# Patient Record
Sex: MALE | Race: WHITE | NOT HISPANIC OR LATINO | URBAN - METROPOLITAN AREA
[De-identification: names, ages, dates, MRNs, and addresses within clinical notes are randomized per-mention and may not be internally consistent; named-entity substitution may affect disease eponyms.]

---

## 2018-09-24 ENCOUNTER — OFFICE VISIT (OUTPATIENT)
Dept: SLEEP MEDICINE | Facility: CLINIC | Age: 43
End: 2018-09-24
Payer: COMMERCIAL

## 2018-09-24 VITALS
DIASTOLIC BLOOD PRESSURE: 98 MMHG | SYSTOLIC BLOOD PRESSURE: 148 MMHG | HEIGHT: 69 IN | BODY MASS INDEX: 46.65 KG/M2 | HEART RATE: 105 BPM | OXYGEN SATURATION: 96 % | WEIGHT: 315 LBS

## 2018-09-24 DIAGNOSIS — G47.30 SLEEP APNEA, UNSPECIFIED TYPE: ICD-10-CM

## 2018-09-24 DIAGNOSIS — R53.81 MALAISE AND FATIGUE: ICD-10-CM

## 2018-09-24 DIAGNOSIS — R06.00 DYSPNEA AND RESPIRATORY ABNORMALITY: Primary | ICD-10-CM

## 2018-09-24 DIAGNOSIS — R03.0 ELEVATED BP WITHOUT DIAGNOSIS OF HYPERTENSION: ICD-10-CM

## 2018-09-24 DIAGNOSIS — R53.83 MALAISE AND FATIGUE: ICD-10-CM

## 2018-09-24 DIAGNOSIS — Z72.820 LACK OF ADEQUATE SLEEP: ICD-10-CM

## 2018-09-24 DIAGNOSIS — R06.89 DYSPNEA AND RESPIRATORY ABNORMALITY: Primary | ICD-10-CM

## 2018-09-24 PROBLEM — K21.9 GASTROESOPHAGEAL REFLUX DISEASE WITHOUT ESOPHAGITIS: Status: ACTIVE | Noted: 2018-09-24

## 2018-09-24 PROCEDURE — 99204 OFFICE O/P NEW MOD 45 MIN: CPT | Performed by: PHYSICIAN ASSISTANT

## 2018-09-24 NOTE — MR AVS SNAPSHOT
After Visit Summary   9/24/2018    Perez Jasso    MRN: [id number]           Patient Information     Date Of Birth          1975        Visit Information        Provider Department      9/24/2018 8:00 AM Larry Gill PA [facility]        Today's Diagnoses     Dyspnea and respiratory abnormality    -  1    Class 3 obesity due to excess calories with body mass index (BMI) of 50.0 to 59.9 in adult, unspecified whether serious comorbidity present (H)        Lack of adequate sleep        Sleep apnea, unspecified type        Malaise and fatigue          Care Instructions      Your BMI is Body mass index is 54.2 kg/(m^2).  Weight management is a personal decision.  If you are interested in exploring weight loss strategies, the following discussion covers the approaches that may be successful. Body mass index (BMI) is one way to tell whether you are at a healthy weight, overweight, or obese. It measures your weight in relation to your height.  A BMI of 18.5 to 24.9 is in the healthy range. A person with a BMI of 25 to 29.9 is considered overweight, and someone with a BMI of 30 or greater is considered obese. More than two-thirds of American adults are considered overweight or obese.  Being overweight or obese increases the risk for further weight gain. Excess weight may lead to heart disease and diabetes.  Creating and following plans for healthy eating and physical activity may help you improve your health.  Weight control is part of healthy lifestyle and includes exercise, emotional health, and healthy eating habits. Careful eating habits lifelong are the mainstay of weight control. Though there are significant health benefits from weight loss, long-term weight loss with diet alone may be very difficult to achieve- studies show long-term success with dietary management in less than 10% of people. Attaining a healthy weight may be especially difficult to achieve in those with severe  obesity. In some cases, medications, devices and surgical management might be considered.  What can you do?  If you are overweight or obese and are interested in methods for weight loss, you should discuss this with your provider.     Consider reducing daily calorie intake by 500 calories.     Keep a food journal.     Avoiding skipping meals, consider cutting portions instead.    Diet combined with exercise helps maintain muscle while optimizing fat loss. Strength training is particularly important for building and maintaining muscle mass. Exercise helps reduce stress, increase energy, and improves fitness. Increasing exercise without diet control, however, may not burn enough calories to loose weight.       Start walking three days a week 10-20 minutes at a time    Work towards walking thirty minutes five days a week     Eventually, increase the speed of your walking for 1-2 minutes at time    In addition, we recommend that you review healthy lifestyles and methods for weight loss available through the National Institutes of Health patient information sites:  http://win.niddk.nih.gov/publications/index.htm    And look into health and wellness programs that may be available through your health insurance provider, employer, local community center, or dianna club.    Weight management plan: Patient was referred to their PCP to discuss a diet and exercise plan.              Follow-ups after your visit        Your next 10 appointments already scheduled     Oct 26, 2018  8:30 AM CDT   LAB with LAB FIRST FLOOR [facility] ([facility])    [id number] [place] [address] [phone]           Please do not eat 10-12 hours before your appointment if you are coming in fasting for labs on lipids, cholesterol, or glucose (sugar). This does not apply to pregnant women. Water, hot tea and black coffee (with nothing added) are okay. Do not drink other fluids, diet soda or  "chew gum.            Oct 28, 2018  8:00 PM CDT   Psg Split W/Tcm with BK BED 2   [facility] ([facility])    [address]  [facility] [id number] [phone]            Nov 12, 2018  2:20 PM CST   Return Sleep Patient with LULY Jasso   [facility] ([facility])    [address]  [facility] [id number] [phone]              Future tests that were ordered for you today     Open Future Orders        Priority Expected Expires Ordered    Blood gas venous Routine  9/24/2019 9/24/2018    Comprehensive Sleep Study Routine  3/23/2019 9/24/2018            Who to contact     If you have questions or need follow up information about today's clinic visit or your schedule please contact [facility] directly at [phone].  Normal or non-critical lab and imaging results will be communicated to you by Conferensumhart, letter or phone within 4 business days after the clinic has received the results. If you do not hear from us within 7 days, please contact the clinic through Kamicatt or phone. If you have a critical or abnormal lab result, we will notify you by phone as soon as possible.  Submit refill requests through AgroSavfe or call your pharmacy and they will forward the refill request to us. Please allow 3 business days for your refill to be completed.          Additional Information About Your Visit        AgroSavfe Information     AgroSavfe lets you send messages to your doctor, view your test results, renew your prescriptions, schedule appointments and more. To sign up, go to [email or web address] . Click on \"Log in\" on the left side of the screen, which will take you to the Welcome page. Then click on \"Sign up Now\" on the right side of the page.     You will be asked to enter the access code listed below, as well as some personal information. Please follow the directions " "to create your username and password.     Your access code is: [email or web address]  Expires: 2018  8:45 AM     Your access code will  in 90 days. If you need help or a new code, please call your [place] clinic or [phone].        Care EveryWhere ID     This is your Care EveryWhere ID. This could be used by other organizations to access your [place] medical records  [id number]        Your Vitals Were     Pulse Height Pulse Oximetry BMI (Body Mass Index)          105 1.753 m (5' 9\") 96% 54.2 kg/m2         Blood Pressure from Last 3 Encounters:   18 (!) 148/98    Weight from Last 3 Encounters:   18 (!) 166.5 kg (367 lb)               Primary Care Provider Fax #    Physician No Ref-Primary [phone]       No address on file        Equal Access to Services     [facility] : Hadii narcisa fostero Sojosh, waaxda luqadaha, qaybta kaalmada adejoannayada, leah haines . So [facility] [phone].    ATENCIÓN: Si habla español, tiene a cisneros disposición servicios gratuitos de asistencia lingüística. Yousif al [phone].    We comply with applicable federal civil rights laws and [place] laws. We do not discriminate on the basis of race, color, national origin, age, disability, sex, sexual orientation, or gender identity.            Thank you!     Thank you for choosing [facility]  for your care. Our goal is always to provide you with excellent care. Hearing back from our patients is one way we can continue to improve our services. Please take a few minutes to complete the written survey that you may receive in the mail after your visit with us. Thank you!             Your Updated Medication List - Protect others around you: Learn how to safely use, store and throw away your medicines at www.disposemymeds.org.          This list is accurate as of 18  8:45 AM.  Always use your most recent med list.                   Brand Name Dispense Instructions for use " Diagnosis    OMEPRAZOLE PO

## 2018-09-24 NOTE — PATIENT INSTRUCTIONS

## 2018-09-24 NOTE — PROGRESS NOTES
Sleep Consultation:    Date on this visit: 9/24/2018    Perez Jasso is sent by No ref. provider found for a sleep consultation    Primary Physician: No Ref-Primary, Physician     Chief Complaint   Patient presents with     Sleep Problem     consult-Chronic fatigue, told I snore too loudly        Perez goes to sleep at 11:00 PM during the week. He wakes up at 7:30 AM with an alarm. He falls asleep in 20 minutes.  Perez denies difficulty falling asleep.  He wakes up 4-6 times a night for 5 minutes before falling back to sleep.  Perez wakes up to gasping and going to the bathroom.  On weekends, Perez goes to sleep at 11:00 PM.  He wakes up at 8:00 AM without an alarm. He falls asleep in 20 minutes.  Patient gets an average of 7 hours of sleep per night. He does not feel refreshed.     Patient does not use electronics in bed, watch TV in bed and read in bed.     Perez does not do shift work.       Perez does snore every night and snoring is very loud. Patient does not have a regular bed partner. There is report of snoring and gasping.  He does have witnessed apneas. Patient sleeps on his back and stomach. He has frequent morning dry mouth and morning headaches(1-2/week), denies no morning confusion and restless legs. Perez denies any bruxism, sleep walking, sleep talking, dream enactment, sleep paralysis, cataplexy and hypnogogic/hypnopompic hallucinations.    Perez has difficulty breathing through his nose, denies claustrophobia and reflux at night.      Perez has gained 40-50 pounds in the last 2 years.  Patient describes themself as a morning person.  He would prefer to go to sleep at 11:00 PM and wake up at 7:00 AM.  Patient's [place] Sleepiness score 19/24 consistent with excessive  daytime sleepiness.      Perez naps 7 times per week for  minutes, feels refreshed after naps. He takes some inadvertant naps.  He denies falling asleep while driving.  Patient was counseled on the importance of driving while  alert, to pull over if drowsy, or nap before getting into the vehicle if sleepy.  He uses 200-300 mg of caffeine in tea and energy drinks. Last caffeine intake is usually before 1 PM.    Allergies:    No Known Allergies    Medications:    Current Outpatient Prescriptions   Medication Sig Dispense Refill     OMEPRAZOLE PO          Problem List:  Patient Active Problem List    Diagnosis Date Noted     Class 3 obesity due to excess calories with body mass index (BMI) of 50.0 to 59.9 in adult, unspecified whether serious comorbidity present (H) 09/24/2018     Priority: Medium     Gastroesophageal reflux disease without esophagitis 09/24/2018     Priority: Medium        Past Medical/Surgical History:  No past medical history on file.  Past Surgical History:   Procedure Laterality Date     TONSILLECTOMY  1985       Social History:  Social History     Social History     Marital status: Single     Spouse name: N/A     Number of children: N/A     Years of education: N/A     Occupational History     Not on file.     Social History Main Topics     Smoking status: Never Smoker     Smokeless tobacco: Never Used     Alcohol use Yes      Comment: socially     Drug use: No     Sexual activity: No     Other Topics Concern     Not on file     Social History Narrative     No narrative on file       Family History:  Family History   Problem Relation Age of Onset     Sleep Apnea Father      Sleep Apnea Brother        Review of Systems:    CONSTITUTIONAL: POSITIVE for weight gain. NEGATIVE for fever, chills, sweats or night sweats, drug allergies.  EYES: NEGATIVE for changes in vision, blind spots, double vision.  ENT: NEGATIVE for ear pain, sore throat, sinus pain, post-nasal drip, runny nose, bloody nose  CARDIAC: NEGATIVE for fast heartbeats or fluttering in chest, chest pain or pressure, breathlessness when lying flat, swollen legs or swollen feet.  NEUROLOGIC: NEGATIVE headaches, weakness or numbness in the arms or  "legs.  DERMATOLOGIC: NEGATIVE for rashes, new moles or change in mole(s)  PULMONARY: NEGATIVE SOB at rest, SOB with activity, dry cough, productive cough, coughing up blood, wheezing or whistling when breathing.    GASTROINTESTINAL: NEGATIVE for nausea or vomitting, loose or watery stools, fat or grease in stools, constipation, abdominal pain, bowel movements black in color or blood noted.  GENITOURINARY: NEGATIVE for pain during urination, blood in urine, urinating more frequently than usual.  MUSCULOSKELETAL: NEGATIVE for muscle pain, bone or joint pain, swollen joints.  ENDOCRINE: NEGATIVE for increased thirst or urination, diabetes.  LYMPHATIC: NEGATIVE for swollen lymph nodes, lumps or bumps in the breasts or nipple discharge.    Physical Examination:  Vitals: BP (!) 148/98  Pulse 105  Ht 1.753 m (5' 9\")  Wt (!) 166.5 kg (367 lb)  SpO2 96%  BMI 54.2 kg/m2  BMI= Body mass index is 54.2 kg/(m^2).    Neck Cir (cm): 48 cm    [place] Total Score 9/24/2018   Total score - [place] 19       LUDIVINA Total Score: 21 (09/24/18 0700)    GENERAL APPEARANCE: alert and no distress  EYES: Eyes grossly normal to inspection, PERRL and conjunctivae and sclerae normal  HENT: ear canals and TM's normal, nose and mouth without ulcers or lesions, oropharynx crowded and tongue base enlarged  NECK: no asymmetry, masses, or scars and thyroid normal to palpation  RESP: lungs clear to auscultation - no rales, rhonchi or wheezes  CV: regular rates and rhythm and normal S1 S2, no S3 or S4  MS: extremities normal- no gross deformities noted  NEURO: Normal strength and tone, mentation intact and speech normal  PSYCH: mentation appears normal and affect normal/bright  Mallampati Class: IV.  Tonsillar Stage: 0  surgically removed.    Last Comprehensive Metabolic Panel:  No results found for: NA, POTASSIUM, CHLORIDE, CO2, ANIONGAP, GLC, BUN, CR, GFRESTIMATED, DONYA    ASSESSMENT:   High probability of obstructive sleep apnea with significant " "possibility of coexisting hypoventilation based on BMI of 54, loud snoring, witnessed apnea, non-refreshing sleep, excessive daytime sleepiness(ESS 19), difficulty maintaining sleep, crowded oropharynx, morning headaches, neck circumference of 18.75\" and strong family history of CATHY. The STOP-BANG score is 6/8.   PLAN:    Recommend Polysomnogram with transcutaneous C02 monitoring and pre-study VBG to evaluate for obstructive sleep apnea, hypoventilation and hypoxemia.  Given the patient's risk of hypoventilation and severity of obesity, laboratory study would be preferable. Patient was counseled in alternative therapies for management of sleep apnea, including weight loss strategies and surgical management.      Literature provided regarding sleep apnea.      He will follow up with me in approximately two weeks after his sleep study has been competed to review the results and discuss plan of care.       Polysomnography reviewed.  Obstructive sleep apnea reviewed.  Complications of untreated sleep apnea were reviewed.    Larry Gill PA-C    CC: No ref. provider found        "

## 2018-09-24 NOTE — NURSING NOTE
"Chief Complaint   Patient presents with     Sleep Problem     consult-Chronic fatigue-issues have esculated with the past year.        Initial BP (!) 148/98  Pulse 105  Ht 1.753 m (5' 9\")  Wt (!) 166.5 kg (367 lb)  SpO2 96%  BMI 54.2 kg/m2 Estimated body mass index is 54.2 kg/(m^2) as calculated from the following:    Height as of this encounter: 1.753 m (5' 9\").    Weight as of this encounter: 166.5 kg (367 lb).    Medication Reconciliation: complete    Neck circumference: 18.75 inches / 48 centimeters.        "

## 2018-10-26 DIAGNOSIS — R06.00 DYSPNEA AND RESPIRATORY ABNORMALITY: ICD-10-CM

## 2018-10-26 DIAGNOSIS — R06.89 DYSPNEA AND RESPIRATORY ABNORMALITY: ICD-10-CM

## 2018-10-26 LAB
CO2 BLDCOV-SCNC: 28 MMOL/L (ref 21–28)
PCO2 BLDV: 42 MM HG (ref 40–50)
PH BLDV: 7.43 PH (ref 7.32–7.43)
PO2 BLDV: 68 MM HG (ref 25–47)
SAO2 % BLDV FROM PO2: 94 %

## 2018-10-28 ENCOUNTER — THERAPY VISIT (OUTPATIENT)
Dept: SLEEP MEDICINE | Facility: CLINIC | Age: 43
End: 2018-10-28
Payer: COMMERCIAL

## 2018-10-28 DIAGNOSIS — G47.30 SLEEP APNEA, UNSPECIFIED TYPE: ICD-10-CM

## 2018-10-28 DIAGNOSIS — R53.83 MALAISE AND FATIGUE: ICD-10-CM

## 2018-10-28 DIAGNOSIS — R06.89 DYSPNEA AND RESPIRATORY ABNORMALITY: ICD-10-CM

## 2018-10-28 DIAGNOSIS — Z72.820 LACK OF ADEQUATE SLEEP: ICD-10-CM

## 2018-10-28 DIAGNOSIS — E66.813 CLASS 3 SEVERE OBESITY DUE TO EXCESS CALORIES WITH SERIOUS COMORBIDITY AND BODY MASS INDEX (BMI) OF 50.0 TO 59.9 IN ADULT (H): ICD-10-CM

## 2018-10-28 DIAGNOSIS — G47.33 OSA (OBSTRUCTIVE SLEEP APNEA): ICD-10-CM

## 2018-10-28 DIAGNOSIS — R06.00 DYSPNEA AND RESPIRATORY ABNORMALITY: ICD-10-CM

## 2018-10-28 DIAGNOSIS — E66.01 CLASS 3 SEVERE OBESITY DUE TO EXCESS CALORIES WITH SERIOUS COMORBIDITY AND BODY MASS INDEX (BMI) OF 50.0 TO 59.9 IN ADULT (H): ICD-10-CM

## 2018-10-28 DIAGNOSIS — R53.81 MALAISE AND FATIGUE: ICD-10-CM

## 2018-10-28 PROCEDURE — 95811 POLYSOM 6/>YRS CPAP 4/> PARM: CPT | Performed by: INTERNAL MEDICINE

## 2018-10-28 NOTE — MR AVS SNAPSHOT
"              After Visit Summary   10/28/2018    Perez Jasso    MRN: [id number]           Patient Information     Date Of Birth          1975        Visit Information        Provider Department      10/28/2018 8:00 PM BK BED 2 [facility]        Today's Diagnoses     Class 3 severe obesity due to excess calories with serious comorbidity and body mass index (BMI) of 50.0 to 59.9 in adult (H)        Lack of adequate sleep        Sleep apnea, unspecified type        Dyspnea and respiratory abnormality        Malaise and fatigue           Follow-ups after your visit        Your next 10 appointments already scheduled     Nov 12, 2018  2:20 PM CST   Return Sleep Patient with LULY Jasso   [facility] ([facility])    [address] [address] [phone]              Who to contact     If you have questions or need follow up information about today's clinic visit or your schedule please contact [facility] directly at [phone].  Normal or non-critical lab and imaging results will be communicated to you by Atomic Mogulshart, letter or phone within 4 business days after the clinic has received the results. If you do not hear from us within 7 days, please contact the clinic through Atomic Mogulshart or phone. If you have a critical or abnormal lab result, we will notify you by phone as soon as possible.  Submit refill requests through Arkmicro or call your pharmacy and they will forward the refill request to us. Please allow 3 business days for your refill to be completed.          Additional Information About Your Visit        Atomic Mogulshart Information     Arkmicro lets you send messages to your doctor, view your test results, renew your prescriptions, schedule appointments and more. To sign up, go to [email or web address] . Click on \"Log in\" on the left side of the screen, which will take you to the Welcome page. Then " "click on \"Sign up Now\" on the right side of the page.     You will be asked to enter the access code listed below, as well as some personal information. Please follow the directions to create your username and password.     Your access code is: [email or web address]  Expires: 2018  8:45 AM     Your access code will  in 90 days. If you need help or a new code, please call your [place] clinic or [phone].        Care EveryWhere ID     This is your Care EveryWhere ID. This could be used by other organizations to access your [place] medical records  [id number]         Blood Pressure from Last 3 Encounters:   18 (!) 148/98    Weight from Last 3 Encounters:   18 (!) 166.5 kg (367 lb)              We Performed the Following     Comprehensive Sleep Study        Primary Care Provider Fax #    Physician No Ref-Primary [phone]       No address on file        Equal Access to Services     GEOVANY OCASIO : Hadii narcisa Abarca, waaxda lubenjamin, qaybta kaalmada adebhanu, leah haines . So [facility] [phone].    ATENCIÓN: Si habla español, tiene a cisneros disposición servicios gratuitos de asistencia lingüística. Llame al [phone].    We comply with applicable federal civil rights laws and [place] laws. We do not discriminate on the basis of race, color, national origin, age, disability, sex, sexual orientation, or gender identity.            Thank you!     Thank you for choosing [facility]  for your care. Our goal is always to provide you with excellent care. Hearing back from our patients is one way we can continue to improve our services. Please take a few minutes to complete the written survey that you may receive in the mail after your visit with us. Thank you!             Your Updated Medication List - Protect others around you: Learn how to safely use, store and throw away your medicines at www.disposemymeds.org.          This list is accurate as " of 10/28/18 11:59 PM.  Always use your most recent med list.                   Brand Name Dispense Instructions for use Diagnosis    OMEPRAZOLE PO

## 2018-10-29 PROBLEM — K21.9 GASTROESOPHAGEAL REFLUX DISEASE WITHOUT ESOPHAGITIS: Chronic | Status: ACTIVE | Noted: 2018-09-24

## 2018-10-29 PROBLEM — G47.33 OSA (OBSTRUCTIVE SLEEP APNEA): Status: ACTIVE | Noted: 2018-10-29

## 2018-10-29 PROBLEM — G47.33 OSA (OBSTRUCTIVE SLEEP APNEA): Chronic | Status: ACTIVE | Noted: 2018-10-29

## 2018-10-29 NOTE — PROCEDURES
" SLEEP STUDY INTERPRETATION  SPLIT NIGHT STUDY      Patient: THIERNO MEIER  YOB: 1975  Study Date: 10/28/2018  MRN: [id number]  Ordering Provider: LULY Geller    Indications for Polysomnography: The patient is a 42 y old Male who is 5' 9\" and weighs 367.0 lbs. His BMI is 54.4, Charlotte sleepiness scale 19.0 and neck circumference is 48.0 cm. A diagnostic polysomnogram was performed to evaluate forHigh probability of obstructive sleep apnea with significant possibility of coexisting hypoventilation based on BMI of 54, loud snoring, witnessed apnea, non-refreshing sleep, excessive daytime sleepiness (ESS 19), difficulty maintaining sleep, crowded oropharynx, morning headaches, neck circumference of 18.75\" and strong family history of CATHY. After 133.0 minutes of sleep time the patient exhibited sufficient respiratory events qualifying him for a CPAP trial which was then initiated.    Polysomnogram Data: A full night polysomnogram recorded the standard physiologic parameters including EEG, EOG, EMG, ECG, nasal and oral airflow. Respiratory parameters of chest and abdominal movements were recorded with respiratory inductance plethysmography. Oxygen saturation was recorded by pulse oximetry.  Hypopnea scoring rule used: 1B 4%    Diagnostic PSG  Sleep Architecture:   The total recording time of the polysomnogram was 238.7 minutes. The total sleep time was 133.0 minutes. Sleep latency was increased at 47.2 minutes without the use of a sleep aid. REM latency was n/a. Arousal index was increased at 123.6 arousals per hour. Sleep efficiency was decreased at 55.7%. Wake after sleep onset was 45.0 minutes. The patient spent 53.0% of total sleep time in Stage N1, 47.0% in Stage N2, 0.0% in Stage N3, and 0.0% in REM.     Respiration:     Events ? The polysomnogram revealed a presence of 250 obstructive, 5 central, and 9 mixed apneas resulting in an apnea index of 119.1 events per hour. There were 43 " obstructive hypopneas and 0 central hypopneas resulting in an obstructive hypopnea index of 19.4 and central hypopnea index of 0 events per hour. The combined apnea/hypopnea index was 138.5 events per hour (central apnea/hypopnea index was 2.3 events per hour).  The REM AHI was n/a. The supine AHI was n/a. The RERA index was 0.5 events per hour. The RDI was 138.9 events per hour.    Snoring - was reported as loud.    Respiratory rate and pattern - was notable for normal respiratory rate and pattern.    Sustained Sleep Associated Hypoventilation - Transcutaneous carbon dioxide monitoring was used, however significant hypoventilation was not suggested with a maximum change from 38 to 45 mmHg.    Sleep Associated Hypoxemia - (Greater than 5 minutes O2 sat at or below 88%) was present. Baseline oxygen saturation was 91.1%. Lowest oxygen saturation was 72.9%. Time spent less than or equal to 88% was 61.2 minutes. Time spent less than or equal to 89% was 68.0 minutes.     Treatment PSG  Sleep Architecture:   At 02:58:03 AM the patient was placed on PAP treatment and was titrated at pressures ranging from CPAP 6 cmH2O up to CPAP 12 cmH2O. The total recording time of the treatment portion of the study was 237.7 minutes. The total sleep time was 178.0 minutes. During the treatment portion of the study the sleep latency was 20.0 minutes. REM latency was 41.0 minutes. Arousal index was normal at 12.1 arousals per hour. Sleep efficiency was/increased at 74.9%. Wake after sleep onset was 39.0 minutes. The patient spent 11.0% of total sleep time in Stage N1, 26.1% in Stage N2, 23.3% in Stage N3, and 39.6% in REM. Time in REM supine was 29.5 minutes.     Respiration:     The final pressure was CPAP 12 cmH2O with an AHI of 12.4 events per hour. Time in REM supine on final pressure was 26.5 minutes.     This titration was considered good (residual AHI < 10 events per hour or 50% decrease if baseline AHI > 15 events per hour, and  including REM?supine sleep at final pressure).    Movement Activity:     Periodic Limb Movements  o During the diagnostic portion of the study, there were 0 PLMs recorded.   o During the treatment portion of the study, there were 34 PLMs recorded. The PLM index was 11.5 movements per hour. The PLM Arousal Index was 0 per hour.    REM EMG Activity - Excessive transient/sustained muscle activity was not present.    Nocturnal Behavior - Abnormal sleep related behaviors were not noted     Bruxism - None apparent.    Cardiac Summary: Arrhythmias were not noted.      Assessment:     Very severe obstructive sleep apnea with severe sleep-related hypoxemia    Good PAP titration    Recommendations:    Treatment of CATHY with CPAP at 13 cmH2O or with Auto?titrating PAP therapy with a range of 10  cmH2O to 14 cmH2O. Recommend clinical follow up with sleep management team, including review of compliance measures.    Patient may be a candidate for dental appliance through referral to Sleep Dentistry for the treatment of obstructive sleep apnea and/or socially disruptive snoring.    If devices are not acceptable or effective, patient may benefit from evaluation of possible surgical options for the treatment of obstructive sleep apnea and/or socially disruptive snoring. If he is interested, would recommend referral to specialized ENT?Sleep provider.    Advice regarding the risks of drowsy driving.    Suggest optimizing sleep schedule and avoiding sleep deprivation.    Weight management (if BMI > 30).    Pharmacologic therapy should be used for management of restless legs syndrome only if present and clinically indicated and not based on the presence of periodic limb movements alone.    Diagnostic Codes:   Obstructive Sleep Apnea G47.33  Sleep Hypoxemia/Hypoventilation G47.36          _____________________________________   Electronically Signed By: Ha Bloom MD 10/29/18         Range(%) Time in range (min)   0.0 - 88.0 61.2   0.0 -  89.0 68.0       Stage Min(mm Hg) Max(mm Hg)   Wake 36.8 55.8   NREM(1+2+3) 39.7 46.0   REM - -         Range(mmHg) Time in range (min)   55.0 - 100.0 0.2   Excluded data <20.0 & >90.0 12.6

## 2018-10-30 ENCOUNTER — TELEPHONE (OUTPATIENT)
Dept: SLEEP MEDICINE | Facility: CLINIC | Age: 43
End: 2018-10-30

## 2018-10-30 DIAGNOSIS — G47.33 OSA (OBSTRUCTIVE SLEEP APNEA): ICD-10-CM

## 2018-10-30 DIAGNOSIS — G47.33 OBSTRUCTIVE SLEEP APNEA: Primary | ICD-10-CM

## 2018-10-30 LAB — SLPCOMP: NORMAL

## 2018-10-30 NOTE — PROGRESS NOTES
I spoke with the patient and discussed his sleep study results. He has very severe obstructive sleep apnea with severe sleep-related hypoxemia.    10/28/2018 [facility] Sleep Study (367.0 lbs) - .5, .9, Supine AHI -, REM AHI -, Low O2% 72.9%, Time Spent ?88% 61.2, Time Spent ?89% 68.0. Treatment was titrated to a pressure of CPAP 12 with an AHI 12.4. Time spent in REM supine at this pressure was 26.5 minutes.    Comprehensive DME. He will call [facility] for a CPAP set up.10

## 2018-10-30 NOTE — TELEPHONE ENCOUNTER
Called and spoke with patient. Scheduled appointment for URGENT PAP setup at [facility] on 10/31/18 at 9:30am. Gave address and location information to patient ([address]).

## 2018-10-31 ENCOUNTER — DOCUMENTATION ONLY (OUTPATIENT)
Dept: SLEEP MEDICINE | Facility: CLINIC | Age: 43
End: 2018-10-31

## 2018-10-31 DIAGNOSIS — G47.33 OSA (OBSTRUCTIVE SLEEP APNEA): Primary | Chronic | ICD-10-CM

## 2018-10-31 NOTE — PROGRESS NOTES
Patient was offered choice of vendor and chose [facility].  Patient Perez Jasso was set up at [employer] on October 31, 2018. Patient received a Resmed AirSense 10 Auto. Pressures were set at 10-16 cm H2O.   Patient s ramp is 6 cm H2O for Auto and FLEX/EPR is EPR, 2.  Patient received a [employer] Mask name: [id number]  Nasal mask Size Medium, heated tubing and heated humidifier.  Patient is enrolled in the STM Program and does not need to meet compliance. Patient has a follow up on 11/12/18 with LULY Adame.    Nuzhat Porter

## 2018-11-03 ENCOUNTER — DOCUMENTATION ONLY (OUTPATIENT)
Dept: SLEEP MEDICINE | Facility: CLINIC | Age: 43
End: 2018-11-03

## 2018-11-03 DIAGNOSIS — G47.33 OSA (OBSTRUCTIVE SLEEP APNEA): ICD-10-CM

## 2018-11-03 NOTE — PROGRESS NOTES
3 DAY STM VISIT    Diagnostic AHI: 138.9 PSG    Patient contacted for 3 day STM visit  Subjective measures:  Pt states that he's still getting used to it.  He feels like when he gets up to go to the bathroom that he has a hard time falling back asleep with it because he isn't getting enough air.  He doesn't want to change his ramp right now but he does know how to do it and will consider doing that to see if it helps get him to a higher pressure quicker so he can stay asleep.     Device type: Auto-CPAP  PAP settings from order::  CPAP min 10 cm  H20       CPAP max 16 cm  H20  Mask type:    Nasal Mask     Device settings from machine      Min CPAP 10            Max CPAP 16     Assessment: Nightly usage, most nights under four hours.  Action plan: Pt to have f/u 14 day [facility] visit.  Patient has a follow up visit scheduled:   yes within 31-90 days of set up.

## 2018-11-15 ENCOUNTER — DOCUMENTATION ONLY (OUTPATIENT)
Dept: SLEEP MEDICINE | Facility: CLINIC | Age: 43
End: 2018-11-15

## 2018-11-15 DIAGNOSIS — G47.33 OSA (OBSTRUCTIVE SLEEP APNEA): ICD-10-CM

## 2018-11-15 NOTE — PROGRESS NOTES
14 DAY STM VISIT    Diagnostic AHI: 138.9 PSG    Subjective measures:   Pt states that he's getting used to it.  He's taking it off in the middle of the night without realizing it.  He's not sure why he's taking it off.  He thinks it might be the pressure waking him up because it's going up to 16cm H2O and he feels like that's too high. His 95% pressure is 13cm H2O so will send order to provider to cap pressure at 14cm H2O to see if that helps keep him from taking off the mask in the middle of the night    Assessment: Pt not meeting objective benchmarks for compliance  Patient failing following subjective benchmarks: pressure issues and dryness  Action plan: order placed to provider for pressure change and pt to have 30 day STM visit.    Device type: Auto-CPAP  PAP settings: CPAP min 10.0 cm  H20     CPAP max 16.0 cm  H20    95th% pressure 12.8 cm   Mask type:  Nasal Mask  Objective measures: 14 day rolling measures      Compliance  0 %      Leak  15 lpm  last  upload      AHI 0.87   last  upload      Average number of minutes 155     Average hours of usage 2.6          Objective measure goal  Compliance   Goal >70%  Leak   Goal < 24 lpm  AHI  Goal < 5  Usage  Goal >240

## 2018-12-03 ENCOUNTER — DOCUMENTATION ONLY (OUTPATIENT)
Dept: SLEEP MEDICINE | Facility: CLINIC | Age: 43
End: 2018-12-03

## 2018-12-03 DIAGNOSIS — G47.33 OSA (OBSTRUCTIVE SLEEP APNEA): ICD-10-CM

## 2018-12-03 NOTE — PROGRESS NOTES
30 DAY STM VISIT    Diagnostic AHI: 138.9 PSG    Message left for patient to return call     Assessment: Pt not meeting objective benchmarks for compliance     Action plan: waiting for patient to return call.  and 2 week STM recheck appt scheduled  Patient has scheduled a follow up visit with LULY Adame on 12/13/18.   Device type: Auto-CPAP  PAP settings: CPAP min 10.0 cm  H20     CPAP max 14.0 cm  H20    CPAP fixed 0 cm  H20    95th% pressure 12.7 cm  H20   Mask type:  Nasal Mask  Objective measures: 14 day rolling measures      Compliance  21 %      Leak  17.66 lpm  last  upload      AHI 0.76   last  upload      Average number of minutes 203      Objective measure goal  Compliance   Goal >70%  Leak   Goal < 24 lpm  AHI  Goal < 5  Usage  Goal >240

## 2018-12-19 ENCOUNTER — DOCUMENTATION ONLY (OUTPATIENT)
Dept: SLEEP MEDICINE | Facility: CLINIC | Age: 43
End: 2018-12-19

## 2018-12-19 DIAGNOSIS — G47.33 OSA (OBSTRUCTIVE SLEEP APNEA): ICD-10-CM

## 2018-12-19 NOTE — PROGRESS NOTES
STM Recheck Visit     Data only recheck     Assessment: Pt meeting objective benchmarks.     Action plan: pt to have 6 month STM visit  Patient has a follow up visit with LULY Adame on 12/24/18.   Device type: Auto-CPAP   PAP settings: CPAP min 10 cm  H20     CPAP max  14 cm  H20    95th% pressure 13.3 cm  H20     Objective measures: 14 day rolling measures      Compliance  100 %      Leak  19.84 lpm  last  upload      AHI  0.78   last  upload      Average number of minutes : 387      Diagnostic AHI:   PSG AHI: 138.9       Objective measure goal  Compliance   Goal >70%  Leak   Goal < 10%  AHI  Goal < 5  Usage  Goal >240

## 2018-12-24 ENCOUNTER — OFFICE VISIT (OUTPATIENT)
Dept: SLEEP MEDICINE | Facility: CLINIC | Age: 43
End: 2018-12-24
Payer: COMMERCIAL

## 2018-12-24 VITALS — BODY MASS INDEX: 46.65 KG/M2 | OXYGEN SATURATION: 98 % | WEIGHT: 315 LBS | HEIGHT: 69 IN | HEART RATE: 105 BPM

## 2018-12-24 DIAGNOSIS — G47.33 OSA (OBSTRUCTIVE SLEEP APNEA): Primary | ICD-10-CM

## 2018-12-24 PROCEDURE — 99213 OFFICE O/P EST LOW 20 MIN: CPT | Performed by: PHYSICIAN ASSISTANT

## 2018-12-24 ASSESSMENT — MIFFLIN-ST. JEOR: SCORE: 2550.08

## 2018-12-24 NOTE — NURSING NOTE
"Chief Complaint   Patient presents with     CPAP Follow Up     study results       Initial BP (!) 169/120   Pulse 105   Ht 1.753 m (5' 9\")   Wt (!) 166.5 kg (367 lb)   SpO2 98%   BMI 54.20 kg/m   Estimated body mass index is 54.2 kg/m  as calculated from the following:    Height as of this encounter: 1.753 m (5' 9\").    Weight as of this encounter: 166.5 kg (367 lb).    Medication Reconciliation: complete    "

## 2018-12-24 NOTE — PROGRESS NOTES
"Obstructive Sleep Apnea - PAP Follow-Up Visit:    Chief Complaint   Patient presents with     CPAP Follow Up     study results       Perez Jasso comes in today for follow-up of their severe sleep apnea, managed with CPAP.     He presented initially with high probability of obstructive sleep apnea with significant possibility of coexisting hypoventilation based on BMI of 54, loud snoring, witnessed apnea, non-refreshing sleep, excessive daytime sleepiness(ESS 19), difficulty maintaining sleep, crowded oropharynx, morning headaches, neck circumference of 18.75\" and strong family history of CATHY.    10/28/2018 [facility] Sleep Study (367.0 lbs) - .5, .9, Supine AHI -, REM AHI -, Low O2% 72.9%, Time Spent =88% 61.2, Time Spent =89% 68.0. Treatment was titrated to a pressure of CPAP 12 with an AHI 12.4. Time spent in REM supine at this pressure was 26.5 minutes. These were reviewed with the patient.     October 31, 2018. Patient received a Resmed AirSense 10 Auto. Pressures were set at 10-16 cm H2O.    Overall, he rates the experience with PAP as 10 (0 poor, 10 great). The mask is comfortable.    The mask is not leaking .  He is not snoring with the mask on. He is not having gasp arousals.  He is not having significant oral/nasal dryness. The pressure is comfortable.     His PAP interface is Nasal Pillows.    Bedtime is typically 2300. Usually it takes about 20 min minutes to fall asleep with the mask on. Wake time is typically 0700.  Patient is using PAP therapy 7 hours per night. The patient is usually getting 7 hours of sleep per night.    He does feel rested in the morning.    Total score - Forsan: 6 (12/24/2018 10:00 AM)      LUDIVINA Total Score: 0    ResMed   Auto-PAP 10 to 14  Auto-PAP download:  Average use 5 hours 45 minutes/day.  30 total days of use.  80% days with <4 hours use.    CPAP 95% pressure 13.2 cm. AHI 0.8    He reports CPAP is going well. No concerns.         Past medical/surgical " "history, family history, social history, medications and allergies were reviewed.      Problem List:  Patient Active Problem List    Diagnosis Date Noted     CATHY (obstructive sleep apnea)- very severe () 10/29/2018     Priority: Medium     10/28/2018 [facility] Sleep Study (367.0 lbs) - .5, .9, Supine AHI -, REM AHI -, Low O2% 72.9%, Time Spent ?88% 61.2, Time Spent ?89% 68.0. Treatment was titrated to a pressure of CPAP 12 with an AHI 12.4. Time spent in REM supine at this pressure was 26.5 minutes.       Class 3 obesity due to excess calories with body mass index (BMI) of 50.0 to 59.9 in adult, unspecified whether serious comorbidity present 09/24/2018     Priority: Medium     Gastroesophageal reflux disease without esophagitis 09/24/2018     Priority: Medium     Elevated BP without diagnosis of hypertension 09/24/2018     Priority: Medium        Pulse 105   Ht 1.753 m (5' 9\")   Wt (!) 166.5 kg (367 lb)   SpO2 98%   BMI 54.20 kg/m       Impression/Plan:    Severe Sleep apnea-  Tolerating PAP well. Daytime symptoms are improved.   Continue current treatment.   Comprehensive DME    Perez Jasso will follow up in about 1 year(s).     Fifteen minutes spent with patient, all of which were spent face-to-face counseling, consulting, coordinating plan of care regarding CATHY.      Larry Gill PA-C    CC:  No Ref-Primary, Physician  "

## 2018-12-24 NOTE — PATIENT INSTRUCTIONS
Your BMI is Body mass index is 54.2 kg/m .  Weight management is a personal decision.  If you are interested in exploring weight loss strategies, the following discussion covers the approaches that may be successful. Body mass index (BMI) is one way to tell whether you are at a healthy weight, overweight, or obese. It measures your weight in relation to your height.  A BMI of 18.5 to 24.9 is in the healthy range. A person with a BMI of 25 to 29.9 is considered overweight, and someone with a BMI of 30 or greater is considered obese. More than two-thirds of American adults are considered overweight or obese.  Being overweight or obese increases the risk for further weight gain. Excess weight may lead to heart disease and diabetes.  Creating and following plans for healthy eating and physical activity may help you improve your health.  Weight control is part of healthy lifestyle and includes exercise, emotional health, and healthy eating habits. Careful eating habits lifelong are the mainstay of weight control. Though there are significant health benefits from weight loss, long-term weight loss with diet alone may be very difficult to achieve- studies show long-term success with dietary management in less than 10% of people. Attaining a healthy weight may be especially difficult to achieve in those with severe obesity. In some cases, medications, devices and surgical management might be considered.  What can you do?  If you are overweight or obese and are interested in methods for weight loss, you should discuss this with your provider.     Consider reducing daily calorie intake by 500 calories.     Keep a food journal.     Avoiding skipping meals, consider cutting portions instead.    Diet combined with exercise helps maintain muscle while optimizing fat loss. Strength training is particularly important for building and maintaining muscle mass. Exercise helps reduce stress, increase energy, and improves fitness.  Increasing exercise without diet control, however, may not burn enough calories to loose weight.       Start walking three days a week 10-20 minutes at a time    Work towards walking thirty minutes five days a week     Eventually, increase the speed of your walking for 1-2 minutes at time    In addition, we recommend that you review healthy lifestyles and methods for weight loss available through the National Institutes of Health patient information sites:  http://win.niddk.nih.gov/publications/index.htm    And look into health and wellness programs that may be available through your health insurance provider, employer, local community center, or dianna club.    Weight management plan: Patient was referred to their PCP to discuss a diet and exercise plan.      Your Body mass index is 54.2 kg/m .  Weight management is a personal decision.  If you are interested in exploring weight loss strategies, the following discussion covers the approaches that may be successful. Body mass index (BMI) is one way to tell whether you are at a healthy weight, overweight, or obese. It measures your weight in relation to your height.  A BMI of 18.5 to 24.9 is in the healthy range. A person with a BMI of 25 to 29.9 is considered overweight, and someone with a BMI of 30 or greater is considered obese. More than two-thirds of American adults are considered overweight or obese.  Being overweight or obese increases the risk for further weight gain. Excess weight may lead to heart disease and diabetes.  Creating and following plans for healthy eating and physical activity may help you improve your health.  Weight control is part of healthy lifestyle and includes exercise, emotional health, and healthy eating habits. Careful eating habits lifelong are the mainstay of weight control. Though there are significant health benefits from weight loss, long-term weight loss with diet alone may be very difficult to achieve- studies show long-term  success with dietary management in less than 10% of people. Attaining a healthy weight may be especially difficult to achieve in those with severe obesity. In some cases, medications, devices and surgical management might be considered.  What can you do?  If you are overweight or obese and are interested in methods for weight loss, you should discuss this with your provider.     Consider reducing daily calorie intake by 500 calories.     Keep a food journal.     Avoiding skipping meals, consider cutting portions instead.    Diet combined with exercise helps maintain muscle while optimizing fat loss. Strength training is particularly important for building and maintaining muscle mass. Exercise helps reduce stress, increase energy, and improves fitness. Increasing exercise without diet control, however, may not burn enough calories to loose weight.       Start walking three days a week 10-20 minutes at a time    Work towards walking thirty minutes five days a week     Eventually, increase the speed of your walking for 1-2 minutes at time    In addition, we recommend that you review healthy lifestyles and methods for weight loss available through the National Institutes of Health patient information sites:  http://win.niddk.nih.gov/publications/index.htm    And look into health and wellness programs that may be available through your health insurance provider, employer, local community center, or dianna club.

## 2019-04-30 ENCOUNTER — DOCUMENTATION ONLY (OUTPATIENT)
Dept: SLEEP MEDICINE | Facility: CLINIC | Age: 44
End: 2019-04-30

## 2019-04-30 DIAGNOSIS — G47.33 OSA (OBSTRUCTIVE SLEEP APNEA): ICD-10-CM

## 2019-04-30 NOTE — PROGRESS NOTES
6 month [facility]    STM Recheck Visit     Diagnostic AHI: 138.9  PSG    Data only recheck     Assessment: Pt meeting objective benchmarks.     Action plan:  pt to follow up per provider request       Device type: Auto-CPAP  PAP settings: CPAP min 10.0 cm  H20     CPAP max 14.0 cm  H20    95th% pressure 12.8 cm  H20   Objective measures: 14 day rolling measures      Compliance  92 %      Leak  12.69 lpm  last  upload      AHI 0.47   last  upload      Average number of minutes 423      Objective measure goal  Compliance   Goal >70%  Leak   Goal < 24 lpm  AHI  Goal < 5  Usage  Goal >240

## 2019-08-27 ENCOUNTER — DOCUMENTATION ONLY (OUTPATIENT)
Dept: SLEEP MEDICINE | Facility: CLINIC | Age: 44
End: 2019-08-27

## 2019-08-27 DIAGNOSIS — G47.33 OSA (OBSTRUCTIVE SLEEP APNEA): ICD-10-CM

## 2024-07-27 NOTE — PROGRESS NOTES
[facility] Recheck Visit -Pt contacted due to low/no usage being received from PAP therapy device.     Subjective measures:   Pt states things are going well and has no issues or complaints.  Pt is benefiting from therapy. Pt put his device in airplane mode as he doesn't want his data transmitted anymore.    Assessment: Pt not meeting objective benchmarks for compliance  Patient meeting subjective benchmarks.   Action plan: follow up per provider request   Device type: Auto-CPAP  PAP settings: CPAP min 10 cm  H20     CPAP max 14 cm  H20  Objective measures: No use since 7/12/19    Diagnostic AHI: 138.9     Objective measure goal  Compliance   Goal >70%  Leak   Goal < 10%  AHI  Goal < 5  Usage  Goal >240        
18